# Patient Record
Sex: FEMALE | Race: WHITE | Employment: OTHER | URBAN - METROPOLITAN AREA
[De-identification: names, ages, dates, MRNs, and addresses within clinical notes are randomized per-mention and may not be internally consistent; named-entity substitution may affect disease eponyms.]

---

## 2018-06-08 ENCOUNTER — OFFICE VISIT (OUTPATIENT)
Dept: VASCULAR SURGERY | Age: 83
End: 2018-06-08

## 2018-06-08 VITALS
WEIGHT: 90 LBS | BODY MASS INDEX: 16.99 KG/M2 | DIASTOLIC BLOOD PRESSURE: 68 MMHG | HEIGHT: 61 IN | SYSTOLIC BLOOD PRESSURE: 110 MMHG

## 2018-06-08 DIAGNOSIS — I70.235 ATHEROSCLEROSIS OF NATIVE ARTERY OF RIGHT LOWER EXTREMITY WITH ULCERATION OF OTHER PART OF FOOT (HCC): ICD-10-CM

## 2018-06-08 PROBLEM — I70.25 ATHEROSCLEROSIS OF NATIVE ARTERY OF EXTREMITY WITH ULCERATION (HCC): Status: ACTIVE | Noted: 2018-06-08

## 2018-06-08 PROCEDURE — 99203 OFFICE O/P NEW LOW 30 MIN: CPT | Performed by: SURGERY

## 2018-06-18 ENCOUNTER — TELEPHONE (OUTPATIENT)
Dept: CARDIOTHORACIC SURGERY | Age: 83
End: 2018-06-18

## 2021-03-30 ENCOUNTER — HOSPITAL ENCOUNTER (INPATIENT)
Age: 86
LOS: 3 days | Discharge: HOSPICE | DRG: 299 | End: 2021-04-02
Attending: INTERNAL MEDICINE | Admitting: INTERNAL MEDICINE
Payer: MEDICARE

## 2021-03-30 DIAGNOSIS — Z51.5 HOSPICE CARE: Primary | ICD-10-CM

## 2021-03-30 PROBLEM — M86.9 OSTEOMYELITIS (HCC): Status: ACTIVE | Noted: 2021-03-30

## 2021-03-30 LAB
A/G RATIO: 0.9 (ref 1.1–2.2)
ALBUMIN SERPL-MCNC: 3 G/DL (ref 3.4–5)
ALP BLD-CCNC: 89 U/L (ref 40–129)
ALT SERPL-CCNC: 23 U/L (ref 10–40)
ANION GAP SERPL CALCULATED.3IONS-SCNC: 7 MMOL/L (ref 3–16)
AST SERPL-CCNC: 34 U/L (ref 15–37)
BASOPHILS ABSOLUTE: 0.1 K/UL (ref 0–0.2)
BASOPHILS RELATIVE PERCENT: 0.5 %
BILIRUB SERPL-MCNC: 0.3 MG/DL (ref 0–1)
BUN BLDV-MCNC: 11 MG/DL (ref 7–20)
CALCIUM SERPL-MCNC: 8.5 MG/DL (ref 8.3–10.6)
CHLORIDE BLD-SCNC: 100 MMOL/L (ref 99–110)
CO2: 28 MMOL/L (ref 21–32)
CREAT SERPL-MCNC: <0.5 MG/DL (ref 0.6–1.2)
EOSINOPHILS ABSOLUTE: 0.1 K/UL (ref 0–0.6)
EOSINOPHILS RELATIVE PERCENT: 0.6 %
GFR AFRICAN AMERICAN: >60
GFR NON-AFRICAN AMERICAN: >60
GLOBULIN: 3.2 G/DL
GLUCOSE BLD-MCNC: 129 MG/DL (ref 70–99)
HCT VFR BLD CALC: 36.1 % (ref 36–48)
HEMOGLOBIN: 12.2 G/DL (ref 12–16)
INR BLD: 1.08 (ref 0.86–1.14)
LYMPHOCYTES ABSOLUTE: 0.8 K/UL (ref 1–5.1)
LYMPHOCYTES RELATIVE PERCENT: 8.3 %
MAGNESIUM: 1.9 MG/DL (ref 1.8–2.4)
MCH RBC QN AUTO: 28.8 PG (ref 26–34)
MCHC RBC AUTO-ENTMCNC: 33.8 G/DL (ref 31–36)
MCV RBC AUTO: 85.2 FL (ref 80–100)
MONOCYTES ABSOLUTE: 0.7 K/UL (ref 0–1.3)
MONOCYTES RELATIVE PERCENT: 7 %
NEUTROPHILS ABSOLUTE: 8.3 K/UL (ref 1.7–7.7)
NEUTROPHILS RELATIVE PERCENT: 83.6 %
PDW BLD-RTO: 12.1 % (ref 12.4–15.4)
PLATELET # BLD: 305 K/UL (ref 135–450)
PMV BLD AUTO: 7.8 FL (ref 5–10.5)
POTASSIUM REFLEX MAGNESIUM: 3.1 MMOL/L (ref 3.5–5.1)
PROTHROMBIN TIME: 12.5 SEC (ref 10–13.2)
RBC # BLD: 4.23 M/UL (ref 4–5.2)
SODIUM BLD-SCNC: 135 MMOL/L (ref 136–145)
TOTAL PROTEIN: 6.2 G/DL (ref 6.4–8.2)
WBC # BLD: 9.9 K/UL (ref 4–11)

## 2021-03-30 PROCEDURE — 2580000003 HC RX 258: Performed by: INTERNAL MEDICINE

## 2021-03-30 PROCEDURE — 85610 PROTHROMBIN TIME: CPT

## 2021-03-30 PROCEDURE — 83735 ASSAY OF MAGNESIUM: CPT

## 2021-03-30 PROCEDURE — 36415 COLL VENOUS BLD VENIPUNCTURE: CPT

## 2021-03-30 PROCEDURE — 80053 COMPREHEN METABOLIC PANEL: CPT

## 2021-03-30 PROCEDURE — 6360000002 HC RX W HCPCS: Performed by: INTERNAL MEDICINE

## 2021-03-30 PROCEDURE — 83036 HEMOGLOBIN GLYCOSYLATED A1C: CPT

## 2021-03-30 PROCEDURE — 85025 COMPLETE CBC W/AUTO DIFF WBC: CPT

## 2021-03-30 PROCEDURE — 1200000000 HC SEMI PRIVATE

## 2021-03-30 RX ORDER — PROMETHAZINE HYDROCHLORIDE 25 MG/1
12.5 TABLET ORAL EVERY 6 HOURS PRN
Status: DISCONTINUED | OUTPATIENT
Start: 2021-03-30 | End: 2021-04-02 | Stop reason: HOSPADM

## 2021-03-30 RX ORDER — SODIUM CHLORIDE 0.9 % (FLUSH) 0.9 %
10 SYRINGE (ML) INJECTION EVERY 12 HOURS SCHEDULED
Status: DISCONTINUED | OUTPATIENT
Start: 2021-03-30 | End: 2021-04-02 | Stop reason: HOSPADM

## 2021-03-30 RX ORDER — DEXTROSE MONOHYDRATE 50 MG/ML
100 INJECTION, SOLUTION INTRAVENOUS PRN
Status: DISCONTINUED | OUTPATIENT
Start: 2021-03-30 | End: 2021-04-02 | Stop reason: HOSPADM

## 2021-03-30 RX ORDER — POLYETHYLENE GLYCOL 3350 17 G/17G
17 POWDER, FOR SOLUTION ORAL DAILY PRN
Status: DISCONTINUED | OUTPATIENT
Start: 2021-03-30 | End: 2021-04-02 | Stop reason: HOSPADM

## 2021-03-30 RX ORDER — ACETAMINOPHEN 325 MG/1
650 TABLET ORAL EVERY 6 HOURS PRN
Status: DISCONTINUED | OUTPATIENT
Start: 2021-03-30 | End: 2021-04-02 | Stop reason: HOSPADM

## 2021-03-30 RX ORDER — ACETAMINOPHEN 650 MG/1
650 SUPPOSITORY RECTAL EVERY 6 HOURS PRN
Status: DISCONTINUED | OUTPATIENT
Start: 2021-03-30 | End: 2021-04-02 | Stop reason: HOSPADM

## 2021-03-30 RX ORDER — SODIUM CHLORIDE 0.9 % (FLUSH) 0.9 %
10 SYRINGE (ML) INJECTION PRN
Status: DISCONTINUED | OUTPATIENT
Start: 2021-03-30 | End: 2021-04-02 | Stop reason: HOSPADM

## 2021-03-30 RX ORDER — SODIUM CHLORIDE 9 MG/ML
25 INJECTION, SOLUTION INTRAVENOUS PRN
Status: DISCONTINUED | OUTPATIENT
Start: 2021-03-30 | End: 2021-04-02 | Stop reason: HOSPADM

## 2021-03-30 RX ORDER — DEXTROSE MONOHYDRATE 25 G/50ML
12.5 INJECTION, SOLUTION INTRAVENOUS PRN
Status: DISCONTINUED | OUTPATIENT
Start: 2021-03-30 | End: 2021-04-02 | Stop reason: HOSPADM

## 2021-03-30 RX ORDER — ONDANSETRON 2 MG/ML
4 INJECTION INTRAMUSCULAR; INTRAVENOUS EVERY 6 HOURS PRN
Status: DISCONTINUED | OUTPATIENT
Start: 2021-03-30 | End: 2021-04-02 | Stop reason: HOSPADM

## 2021-03-30 RX ORDER — NICOTINE POLACRILEX 4 MG
15 LOZENGE BUCCAL PRN
Status: DISCONTINUED | OUTPATIENT
Start: 2021-03-30 | End: 2021-04-02 | Stop reason: HOSPADM

## 2021-03-30 RX ADMIN — SODIUM CHLORIDE, PRESERVATIVE FREE 10 ML: 5 INJECTION INTRAVENOUS at 19:55

## 2021-03-30 RX ADMIN — PIPERACILLIN AND TAZOBACTAM 3375 MG: 3; .375 INJECTION, POWDER, FOR SOLUTION INTRAVENOUS at 19:55

## 2021-03-30 NOTE — H&P
Hospital Medicine History & Physical      PCP: Tom Long, APRN - CNP    Date of Admission: 3/30/2021    Date of Service: Pt seen/examined on 3/30/21 and Admitted to Inpatient with expected LOS greater than two midnights due to medical therapy. Chief Complaint:  Gangrene of foot      History Of Present Illness: (per emr/paper chart as pt is poor historian)    81 y.o. female with PVD/PAD(has seen Dr. Wood ), dm2, prior CVA(per emr) who presented to [facility] with gangrene and possible rt foot OM. Pt first presented and admitted to [address] [place] [address] on 3/26 for severe weakness and n/v. Pt was given ivfs and podiatry was consulted for gangrene of right foot/toes(pt apparently developed blackening of right toes 3 days prior to admission). Pt currently denies cp/sob and denies foot/leg pain. Pt was transferred here for further evaluation and possibly presumed vascular surgery intervention. Per notes, family was requesting aggressive care. Past Medical History:          Diagnosis Date    [facility]     Diabetes ([facility]     PAD (peripheral artery disease) ([facility]        Past Surgical History:      History reviewed. No pertinent surgical history. Medications Prior to Admission:      Prior to Admission medications    Medication Sig Start Date End Date Taking? Authorizing Provider   aspirin 81 MG tablet Take 81 mg by mouth daily    Historical Provider, MD   amLODIPine (NORVASC) 10 MG tablet Take 10 mg by mouth daily. Historical Provider, MD   clopidogrel (PLAVIX) 75 MG tablet Take 75 mg by mouth daily. Historical Provider, MD   atenolol (TENORMIN) 25 MG tablet Take 25 mg by mouth daily. Historical Provider, MD   lisinopril (PRINIVIL;ZESTRIL) 40 MG tablet Take 40 mg by mouth daily. Historical Provider, MD   vitamin D (ERGOCALCIFEROL) 82483 UNITS CAPS capsule Take 50,000 Units by mouth once a week.     Historical Provider, MD   cilostazol (PLETAL) 100 MG tablet Take 1 tablet by mouth 2 times daily. 2/1/13   Natanael Martines MD       Allergies:  Penicillins    Social History:      The patient currently lives at home with family    TOBACCO:   reports that she has never smoked. She has never used smokeless tobacco.  ETOH:   reports no history of alcohol use. E-Cigarettes/Vaping Use     Questions Responses    E-Cigarette/Vaping Use     Start Date     Passive Exposure     Quit Date     Counseling Given     Comments             Family History:       Reviewed in detail and negative for DM, CAD, Cancer, CVA. Positive as follows:    History reviewed. No pertinent family history. REVIEW OF SYSTEMS:   Pertinent positives as noted in the HPI. All other systems reviewed and negative. PHYSICAL EXAM PERFORMED:    BP (!) 210/79   Pulse 89   Temp 98.2 °F (36.8 °C) (Oral)   Resp 14   SpO2 95%     General appearance:  No apparent distress, appears stated age and cooperative. Frail and cachetic appearing  HEENT:  Normal cephalic, atraumatic without obvious deformity. Pupils equal, round, and reactive to light. Extra ocular muscles intact. Conjunctivae/corneas clear. Neck: Supple, with full range of motion. No jugular venous distention. Trachea midline. Respiratory:  Normal respiratory effort. Clear to auscultation, bilaterally without Rales/Wheezes/Rhonchi. Cardiovascular:  Regular rate and rhythm with normal S1/S2 without murmurs, rubs or gallops. Abdomen: Soft, non-tender, non-distended with normal bowel sounds. Musculoskeletal:  No clubbing, cyanosis or edema bilaterally. Full range of motion without deformity, right toes dry gangrene noted, left 1st/3rd toe amputated  Skin: Skin color, texture, turgor normal.  No rashes or lesions. Neurologic:  Neurovascularly intact without any focal sensory/motor deficits.  Cranial nerves: II-XII intact, grossly non-focal.  Psychiatric:  Alert and oriented x1, thought content not appropriate, not normal insight  Capillary Refill: Brisk,< 3 seconds   Peripheral Pulses: +2 palpable, equal bilaterally       Labs:     No results for input(s): WBC, HGB, HCT, PLT in the last 72 hours. No results for input(s): NA, K, CL, CO2, BUN, CREATININE, CALCIUM, PHOS in the last 72 hours. Invalid input(s): MAGNES  No results for input(s): AST, ALT, BILIDIR, BILITOT, ALKPHOS in the last 72 hours. No results for input(s): INR in the last 72 hours. No results for input(s): Everrett Becker in the last 72 hours. Urinalysis:    No results found for: Shima Lagos, BACTERIA, [facility], BLOODU, [address], [facility]e [facility] [address]    Radiology:     CXR: I have reviewed the CXR with the following interpretation: na  EKG:  I have reviewed the EKG with the following interpretation: na    No orders to display       ASSESSMENT:    Active Hospital Problems    Diagnosis Date Noted    [facility] [M86.9] 03/30/2021         PLAN:    Dry gangrene of right foot/toes- noted on admission, no pain currently  -on iv zosyn at [facility], continued here  -Podiatry consulted, apprec recs  -ID consulted given OM noted on outside MRI    PVD-per emr, seen Dr. Gifty Harrison in 2018  -vas surg consulted, apprec recs    DM2-per emr  Ac/hs bs  Low ssi  -check a1c    Dementia- ongoing, possibly worsening given pt's debility and frailty/cachexia  -SLP eval    DVT Prophylaxis: lovenox  Diet: DIET GENERAL;  Code Status: Full Code    PT/OT Eval Status: not ordered    Dispo - pending workup, guarded prognosis       Hilton Carl MD    Thank you WASHINGTON Franco - CNP for the opportunity to be involved in this patient's care. If you have any questions or concerns please feel free to contact me at [phone].

## 2021-03-30 NOTE — PROGRESS NOTES
Patient arrived on the unit about [id number], send perfect serve to Dr. Rad Adams to get orders, she responded and said it would be a bit, awaiting orders. Patient in room, BP elevated at this time, will monitor.

## 2021-03-30 NOTE — PROGRESS NOTES
03/30/21 1859 -Infectious disease consult perfect served and RNs number provided for callback if needed.     Leandro Jeffries, PCA

## 2021-03-31 LAB
ESTIMATED AVERAGE GLUCOSE: 139.9 MG/DL
GLUCOSE BLD-MCNC: 143 MG/DL (ref 70–99)
GLUCOSE BLD-MCNC: 77 MG/DL (ref 70–99)
GLUCOSE BLD-MCNC: 80 MG/DL (ref 70–99)
GLUCOSE BLD-MCNC: 90 MG/DL (ref 70–99)
GLUCOSE BLD-MCNC: 92 MG/DL (ref 70–99)
HBA1C MFR BLD: 6.5 %
PERFORMED ON: ABNORMAL
PERFORMED ON: NORMAL

## 2021-03-31 PROCEDURE — 1200000000 HC SEMI PRIVATE

## 2021-03-31 PROCEDURE — 6360000002 HC RX W HCPCS: Performed by: NURSE PRACTITIONER

## 2021-03-31 PROCEDURE — 6360000002 HC RX W HCPCS: Performed by: INTERNAL MEDICINE

## 2021-03-31 PROCEDURE — 99222 1ST HOSP IP/OBS MODERATE 55: CPT | Performed by: SURGERY

## 2021-03-31 PROCEDURE — 6370000000 HC RX 637 (ALT 250 FOR IP): Performed by: INTERNAL MEDICINE

## 2021-03-31 PROCEDURE — 99221 1ST HOSP IP/OBS SF/LOW 40: CPT | Performed by: INTERNAL MEDICINE

## 2021-03-31 PROCEDURE — 2580000003 HC RX 258: Performed by: INTERNAL MEDICINE

## 2021-03-31 RX ORDER — HYDRALAZINE HYDROCHLORIDE 20 MG/ML
10 INJECTION INTRAMUSCULAR; INTRAVENOUS ONCE
Status: COMPLETED | OUTPATIENT
Start: 2021-03-31 | End: 2021-03-31

## 2021-03-31 RX ORDER — HYDRALAZINE HYDROCHLORIDE 20 MG/ML
10 INJECTION INTRAMUSCULAR; INTRAVENOUS EVERY 6 HOURS PRN
Status: DISCONTINUED | OUTPATIENT
Start: 2021-03-31 | End: 2021-04-02 | Stop reason: HOSPADM

## 2021-03-31 RX ORDER — MORPHINE SULFATE 2 MG/ML
0.5 INJECTION, SOLUTION INTRAMUSCULAR; INTRAVENOUS DAILY PRN
Status: DISCONTINUED | OUTPATIENT
Start: 2021-03-31 | End: 2021-04-02 | Stop reason: HOSPADM

## 2021-03-31 RX ORDER — LISINOPRIL 20 MG/1
20 TABLET ORAL DAILY
Status: DISCONTINUED | OUTPATIENT
Start: 2021-03-31 | End: 2021-04-02 | Stop reason: HOSPADM

## 2021-03-31 RX ADMIN — HYDRALAZINE HYDROCHLORIDE 10 MG: 20 INJECTION INTRAMUSCULAR; INTRAVENOUS at 01:48

## 2021-03-31 RX ADMIN — ENOXAPARIN SODIUM 40 MG: 40 INJECTION SUBCUTANEOUS at 08:40

## 2021-03-31 RX ADMIN — PIPERACILLIN AND TAZOBACTAM 3375 MG: 3; .375 INJECTION, POWDER, FOR SOLUTION INTRAVENOUS at 12:05

## 2021-03-31 RX ADMIN — PIPERACILLIN AND TAZOBACTAM 3375 MG: 3; .375 INJECTION, POWDER, FOR SOLUTION INTRAVENOUS at 04:14

## 2021-03-31 RX ADMIN — LISINOPRIL 20 MG: 20 TABLET ORAL at 18:20

## 2021-03-31 RX ADMIN — MORPHINE SULFATE 0.5 MG: 2 INJECTION, SOLUTION INTRAMUSCULAR; INTRAVENOUS at 17:46

## 2021-03-31 RX ADMIN — SODIUM CHLORIDE, PRESERVATIVE FREE 10 ML: 5 INJECTION INTRAVENOUS at 23:03

## 2021-03-31 RX ADMIN — SODIUM CHLORIDE, PRESERVATIVE FREE 10 ML: 5 INJECTION INTRAVENOUS at 08:41

## 2021-03-31 RX ADMIN — PIPERACILLIN AND TAZOBACTAM 3375 MG: 3; .375 INJECTION, POWDER, FOR SOLUTION INTRAVENOUS at 23:03

## 2021-03-31 RX ADMIN — INSULIN LISPRO 1 UNITS: 100 INJECTION, SOLUTION INTRAVENOUS; SUBCUTANEOUS at 23:17

## 2021-03-31 RX ADMIN — SODIUM CHLORIDE 25 ML: 9 INJECTION, SOLUTION INTRAVENOUS at 12:06

## 2021-03-31 RX ADMIN — HYDRALAZINE HYDROCHLORIDE 10 MG: 20 INJECTION INTRAMUSCULAR; INTRAVENOUS at 16:15

## 2021-03-31 RX ADMIN — SODIUM CHLORIDE 25 ML: 9 INJECTION, SOLUTION INTRAVENOUS at 23:03

## 2021-03-31 ASSESSMENT — PAIN SCALES - GENERAL: PAINLEVEL_OUTOF10: 8

## 2021-03-31 NOTE — PROGRESS NOTES
Attempted to call Speech therapy for follow up visit this evening . No answer at this time. Will call back later.

## 2021-03-31 NOTE — PROGRESS NOTES
Pt comfortably resting in bed with x4 bedrails up as requested by family. Call light within reach and bed locked. Family at bedside. Pt turned q2hr. Pt not responding to voice, but will respond to painful stimuli and responds to position changes. Will continues to assess pt.

## 2021-03-31 NOTE — PROGRESS NOTES
Perfect serve sent to MD; \"Pt will not stay up long enough to take oral medication. There is a scheduled order for lisinopril at 1600.  Thanks\"

## 2021-03-31 NOTE — PROGRESS NOTES
Speech Language Pathology  Attempt Note   SLP acknowledged order and spoke with RN. Pt unable to wake to SLP's voice, repositioning, lights and sternal rub. Please call ST if pt demonstrates adequate DEEDEE for BSE ([phone]).     Jahaira Flores, Speech Therapy

## 2021-03-31 NOTE — CONSULTS
Infectious Diseases   Consult Note      Reason for Consult:  OM toes   Requesting Physician:  Dr. Supriya De La Garza      Date of Admission: 3/30/2021  Subjective:   CHIEF COMPLAINT:  None given       HPI:    Marleny Vazquez is an 81yoF with history of PAD, DM, CVA, dementia, non-ambulatory at baseline. Lives with her daughter. Admitted to [facility] 3/26/21 for evaluation of N/V/weakness. Noted to have gangrene of toes of R foot. She is afebrile on room air. Transferred for vascular evaluation. Patient is non-verbal and does not contribute to the history. Grandson at bedside. Current abx:  Zosyn 3.375 q8       Past Surgical History:       Diagnosis Date    [facility]A [facility]cere[facility]) [facility]     Diabetes ([facility]     PAD (peripheral artery disease) ([facility]      History reviewed. No pertinent surgical history. Social History:    TOBACCO:   reports that she has never smoked. She has never used smokeless tobacco.  ETOH:   reports no history of alcohol use. There is no history of illicit drug use or other significant epidemiologic exposures. Family History:   History reviewed. No pertinent family history. There is no family history of autoimmune diseases or significant infectious diseases.       Current Medications:    Current Facility-Administered Medications: sodium chloride flush 0.9 % injection 10 mL, 10 mL, Intravenous, 2 times per day  sodium chloride flush 0.9 % injection 10 mL, 10 mL, Intravenous, PRN  0.9 % sodium chloride infusion, 25 mL, Intravenous, PRN  enoxaparin (LOVENOX) injection 40 mg, 40 mg, Subcutaneous, Daily  promethazine (PHENERGAN) tablet 12.5 mg, 12.5 mg, Oral, Q6H PRN **OR** ondansetron (ZOFRAN) injection 4 mg, 4 mg, Intravenous, Q6H PRN  polyethylene glycol (GLYCOLAX) packet 17 g, 17 g, Oral, Daily PRN  acetaminophen (TYLENOL) tablet 650 mg, 650 mg, Oral, Q6H PRN **OR** acetaminophen (TYLENOL) suppository 650 mg, 650 mg, Rectal, Q6H PRN  piperacillin-tazobactam (ZOSYN) 3,375 mg in dextrose 5 % 50 mL IVPB extended infusion (mini-bag), 3,375 mg, Intravenous, Q8H  insulin lispro (HUMALOG) injection vial 0-6 Units, 0-6 Units, Subcutaneous, TID WC  insulin lispro (HUMALOG) injection vial 0-3 Units, 0-3 Units, Subcutaneous, Nightly  glucose (GLUTOSE) 40 % oral gel 15 g, 15 g, Oral, PRN  dextrose 50 % IV solution, 12.5 g, Intravenous, PRN  glucagon (rDNA) injection 1 mg, 1 mg, Intramuscular, PRN  dextrose 5 % solution, 100 mL/hr, Intravenous, PRN      Allergies   Allergen Reactions    Penicillins         REVIEW OF SYSTEMS:    Unable to obtain       Objective:   PHYSICAL EXAM:      VITALS:  BP (!) 166/62   Pulse 69   Temp 97.4 °F (36.3 °C) (Axillary)   Resp 15   Wt 68 lb 9 oz (31.1 kg)   SpO2 96%   BMI 18.53 kg/m²      24HR INTAKE/OUTPUT:      Intake/Output Summary (Last 24 hours) at 3/31/2021 1236  Last data filed at 3/31/2021 1210  Gross per 24 hour   Intake    Output 775 ml   Net -775 ml     CONSTITUTIONAL:   Frail, cachectic elderly female  Lying in bed, limbs contracted  She is non-verbal and does not cooperate with the exam   NECK:  Supple  LUNGS:  no increased work of breathing  ABDOMEN:  normal bowel sounds, soft, flat  MUSCULOSKELETAL: No obvious misalignment or effusion of the joints. No clubbing, cyanosis of the digits. Muscular atrophy   SKIN:   Dry gangrene of 1/3/4 R toes. Pressure ulcer medial malleolus R ankle   Wound tip of L 2nd toe with generalized edema   NEUROLOGIC: nonfocal exam  ACCESS:  IV site ok                     DATA:    Old records have been reviewed    CBC:  Recent Labs     03/30/21 1927   WBC 9.9   RBC 4.23   HGB 12.2   HCT 36.1      MCV 85.2   MCH 28.8   MCHC 33.8   RDW 12.1*      BMP:  Recent Labs     03/30/21 1927   *   K 3.1*      CO2 28   BUN 11   CREATININE <0.5*   CALCIUM 8.5   GLUCOSE 129*        Radiology Review:  All pertinent images / reports were reviewed as a part of this visit.    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 Palliative approach may be most appropriate  -no role for prolonged abx for OM of the gangrenous toes. Would continue betadine and will change to po doxycycline if/when she is able to take po     D/w tiffani at bedside, Dr. Sonal Smith M.D. Thank you for the opportunity to participate in the care of your patient.     Please do not hesitate to contact me:   [phone] office  [phone] mobile

## 2021-03-31 NOTE — PROGRESS NOTES
Perfect serve sent to MD regarding high /78; \"Pt BP is elevated 215/78. HR 66 temp 97.5 SpO2 96% RA. Please advise. Thank you. Awaiting response.

## 2021-03-31 NOTE — PROGRESS NOTES
Comprehensive Nutrition Assessment    Type and Reason for Visit:  Initial(decreased appetite, wounds, swallowing difficulty)    Nutrition Recommendations/Plan:   1. Oral diet progression per SLP recommendations   2. Offer Ensure and Magic cup with meals to provide extra protein  3. Will monitor nutritional adequacy, nutrition-related labs, weights, BMs, and clinical progress     Nutrition Assessment:  Patient admitted with osteomyelitis and gangrenous toes, podiatry consulted. Currently on a pureed diet, SLP consulted but unable to wake up patient to evaluate today. Positive nutrition screen for non healing wound, swallowing difficulty, and decreased appetite. No po intake recorded at this time. Malnutrition Assessment:  Malnutrition Status:   At risk for malnutrition (Comment)      Estimated Daily Nutrient Needs:  Energy (kcal):  3345-5464; Weight Used for Energy Requirements:  Current(31.1 kg)     Protein (g):  62-75; Weight Used for Protein Requirements:  Ideal(1.3-1.5)        Fluid (ml/day):   ; Method Used for Fluid Requirements:  1 ml/kcal      Nutrition Related Findings:  Na 135, K 3.1 on 3/30/21      Wounds:  (redness bilateral heels, coccyx)       Current Nutrition Therapies:    DIET GENERAL; Dysphagia Pureed    Anthropometric Measures:  · Height: 5' 1\" (154.9 cm)  · Current Body Weight: 68 lb (30.8 kg)   · Admission Body Weight: 68 lb 9 oz (31.1 kg)    · Ideal Body Weight: 105 lbs; % Ideal Body Weight     · BMI:    12.95  · BMI Categories: Underweight (BMI less than 22) age over 72       Nutrition Diagnosis:   · Increased nutrient needs related to increase demand for energy/nutrients as evidenced by wounds, weight loss    Nutrition Interventions:   Food and/or Nutrient Delivery:  Continue Current Diet, Start Oral Nutrition Supplement  Nutrition Education/Counseling:  No recommendation at this time   Coordination of Nutrition Care:  Continue to monitor while inpatient    Goals:  Patient will eat 50% or greater of meals and supplements. Nutrition Monitoring and Evaluation:   Behavioral-Environmental Outcomes:  None Identified   Food/Nutrient Intake Outcomes:  Supplement Intake, Food and Nutrient Intake  Physical Signs/Symptoms Outcomes:  Biochemical Data, Nutrition Focused Physical Findings     Discharge Planning:     Too soon to determine     Electronically signed by Alta Dodson, [address] [place] [address],  on 3/31/21 at 2:49 PM EDT    Contact: Office: [phone]; [address] [place] [address] Road: [phone]

## 2021-03-31 NOTE — PROGRESS NOTES
Skin color, texture, turgor normal.  No rashes or lesions. Neurologic:  Neurovascularly intact without any focal sensory/motor deficits. Cranial nerves: II-XII intact, grossly non-focal.  Psychiatric:  Alert and oriented x1, thought content not appropriate, not normal insight  Capillary Refill: Brisk,< 3 seconds   Peripheral Pulses: +2 palpable, equal bilaterally       Labs:   Recent Labs     03/30/21 1927   WBC 9.9   HGB 12.2   HCT 36.1        Recent Labs     03/30/21 1927   *   K 3.1*      CO2 28   BUN 11   CREATININE <0.5*   CALCIUM 8.5     Recent Labs     03/30/21 1927   AST 34   ALT 23   BILITOT 0.3   ALKPHOS 89     Recent Labs     03/30/21 1927   INR 1.08     No results for input(s): Idrisgamaliel Morgan in the last 72 hours.     Urinalysis:    No results found for: Beata Petty, BACTERIA, RBCUA, BLOODU, [address], [place] [place] [address]    Radiology:  No orders to display           Assessment/Plan:    Active Hospital Problems    Diagnosis    Osteomyelitis ([facility] [M86.9]       Dry gangrene of right foot/toes- noted on admission, no pain currently  -on iv zosyn at [facility], continued here  -Podiatry consulted, apprec recs  -ID consulted given OM noted on outside MRI     PVD-per emr, seen Dr. Jennifer Palomares in 2018  -vas surg consulted, apprec recs     DM2-per emr  Ac/hs bs  Low ssi  -check a1c     Dementia- ongoing, possibly worsening given pt's debility and frailty/cachexia  -SLP eval     DVT Prophylaxis: lovenox  Diet: DIET GENERAL; Dysphagia Pureed  Code Status: Full Code    PT/OT Eval Status: not yet ordered    Dispo -unclear,  pending workup, podiatry Isela Zhang, id recs,     Celestino Nevarez MD

## 2021-03-31 NOTE — PROGRESS NOTES
4 Eyes Skin Assessment     The patient is being assess for   Admission    I agree that 2 RN's have performed a thorough Head to Toe Skin Assessment on the patient. ALL assessment sites listed below have been assessed. Areas assessed by both nurses:   [x]   Head, Face, and Ears   [x]   Shoulders, Back, and Chest, Abdomen  [x]   Arms, Elbows, and Hands   [x]   Coccyx, Sacrum, and Ischium  [x]   Legs, Feet, and Heels        Rt foot- toes 1,3 and 4 dry gangrene  2 Wounds noted on coccyx/sacrum-  Redness noted on coccyx/sacrum  Wound noted on left inner ankle  Scattered scabs t/o   BL hips red blanchable  Left heel red/wound      **SHARE this note so that the co-signing nurse is able to place an eSignature**    Co-signer eSignature: {Esignature:[id number]}    Does the Patient have Skin Breakdown?   Yes LDA WOUND CARE was Initiated documentation include the Alicia-wound, Wound Assessment, Measurements, Dressing Treatment, Drainage, and Color\",          Ervin Prevention initiated:  Yes   Wound Care Orders initiated:  Yes      [id number]  nurse consulted for Pressure Injury (Stage 3,4, Unstageable, DTI, NWPT, Complex wounds)and New or Established Ostomies:  Yes      Primary Nurse eSignature: Electronically signed by Mary Beth Cha RN on 3/31/21 at 2:24 AM EDT

## 2021-03-31 NOTE — CONSULTS
Vascular Surgery Consultation    Date of Admission:  3/30/2021  4:43 PM  Date of Consultation:  3/31/2021    PCP:  WASHINGTON Funk CNP       Chief Complaint:  Foot gangrene    History of Present Illness: We are asked to see this patient in consultation by Dr. Jan Tolbert regarding gangrene and PVD. Sarita Ramírez is a 81 y.o. female who is cachectic, with dementia, and non ambulatory. She has known PVD- I performed an angiogram in 2018 showing diffuse SFA and tibial disease but poor flow below ankle. She reportedly developed gangrenous changes to toes only last week. Patient can provide no history and does not appear to be in any distress. Past Medical History:  Past Medical History:   Diagnosis Date    [facility]     Diabetes ([facility]     PAD (peripheral artery disease) ([facility]        Past Surgical History:  History reviewed. No pertinent surgical history. Home Medications:   Prior to Admission medications    Medication Sig Start Date End Date Taking? Authorizing Provider   aspirin 81 MG tablet Take 81 mg by mouth daily    Historical Provider, MD   amLODIPine (NORVASC) 10 MG tablet Take 10 mg by mouth daily. Historical Provider, MD   clopidogrel (PLAVIX) 75 MG tablet Take 75 mg by mouth daily. Historical Provider, MD   atenolol (TENORMIN) 25 MG tablet Take 25 mg by mouth daily. Historical Provider, MD   lisinopril (PRINIVIL;ZESTRIL) 40 MG tablet Take 40 mg by mouth daily. Historical Provider, MD   vitamin D (ERGOCALCIFEROL) 43087 UNITS CAPS capsule Take 50,000 Units by mouth once a week. Historical Provider, MD   cilostazol (PLETAL) 100 MG tablet Take 1 tablet by mouth 2 times daily.  2/1/13   Shubham Glass MD        Facility Administered Medications:    sodium chloride flush  10 mL Intravenous 2 times per day    enoxaparin  40 mg Subcutaneous Daily    piperacillin-tazobactam  3,375 mg Intravenous Q8H    insulin lispro  0-6 Units Subcutaneous TID WC    insulin lispro  0-3 Units Subcutaneous Nightly       Allergies:  Penicillins     Social History:      Social History     Socioeconomic History    Marital status:      Spouse name: Not on file    Number of children: Not on file    Years of education: Not on file    Highest education level: Not on file   Occupational History    Not on file   Social Needs    Financial resource strain: Not on file    Food insecurity     Worry: Not on file     Inability: Not on file    Transportation needs     Medical: Not on file     Non-medical: Not on file   Tobacco Use    Smoking status: Never Smoker    Smokeless tobacco: Never Used   Substance and Sexual Activity    Alcohol use: No    Drug use: No    Sexual activity: Not on file   Lifestyle    Physical activity     Days per week: Not on file     Minutes per session: Not on file    Stress: Not on file   Relationships    Social connections     Talks on phone: Not on file     Gets together: Not on file     Attends Jain service: Not on file     Active member of club or organization: Not on file     Attends meetings of clubs or organizations: Not on file     Relationship status: Not on file    Intimate partner violence     Fear of current or ex partner: Not on file     Emotionally abused: Not on file     Physically abused: Not on file     Forced sexual activity: Not on file   Other Topics Concern    Not on file   Social History Narrative    Not on file       Family History:    History reviewed. No pertinent family history. Review of Systems:  A 14 point review of systems was completed. Pertinent positives identified in the HPI, all other review of systems negative.       Physical Examination:    BP (!) 154/71   Pulse 71   Temp 97.6 °F (36.4 °C) (Axillary)   Resp 15   Wt 68 lb 9 oz (31.1 kg)   SpO2 95%   BMI 18.53 kg/m²        Admission Weight: 68 lb 9 oz (31.1 kg)       General appearance: NAD, frail and

## 2021-03-31 NOTE — CARE COORDINATION
CASE MANAGEMENT INITIAL ASSESSMENT      Reviewed chart and completed assessment with:grandson in room  Explained Case Management role/services. Primary contact information:see below  Health Care Decision Maker :   Primary Decision Maker: Rhonda Hernandez - [phone]    Secondary Decision Maker: Kendall Aguiar - [phone]    Supplemental (Other) Decision Maker: Barbara Credit - [phone]      Can this person be reached and be able to respond quickly, such as within a few minutes or hours? Yes  Admit date/status:03/30/2021  Diagnosis:Osteomylelitis   Is this a Readmission?:  No      Insurance:[employer] Medicare   Precert required for SNF: Yes       3 night stay required: No    Living arrangements, Adls, care needs, prior to admission:Pt lives w/daughter Leonor Smith, family assists w/care and per grandson has bee getting increasingly weaker in the last month    Transportation:ambulance likely     Durable Medical Equipment at home:  Walker_x_Canex__RTS__ BSC_x_Shower Chair_x_  02__ HHN__ CPAP__  BiPap__  Hospital Bed__ W/C_x__ Other__________    Services in the home and/or outpatient, prior to [address] Notification (HEN):not initiated    Barriers to discharge:none    Plan/comments:CM spoke to grandson at bedside. Pt was sleeping. He states that family has been taking care of grandmother. She has been growing increasingly weak over the last month and needing increased care. Family lift her into a wheel chair. Pt may need hospice and pending MD input, family aware of possible need. CM will assist in guiding family once determination has been made. Per grandson, family will desire to take her home. Karon Lr will attempt to find name of hospice company used when grandfather passed away.   Ann Callejas RN       [facility] on chart for MD signature

## 2021-03-31 NOTE — CONSULTS
[facility] Wound Ostomy Continence Nurse  Consult Note       NAME:  73Jules Talavera RECORD NUMBER:  [id number]  AGE: 81 y.o. GENDER: female  : 1935  TODAY'S DATE:  3/31/2021    Subjective: This happened on the other foot but not as extensive. She did lose a few toes. Reason for WOCN Evaluation and Assessment: R Toes 1,3, 4 - necrotic  L 2nd Toe - traumatic, hammer toe rubbing  R Medial Ankle - Unstageable dry yellow eschar      Maritza Nascimento is a 81 y.o. female referred by:   [] Physician  [x] Nursing  [] Other:     Wound Identification:  Wound Type: arterial, pressure and traumatic  Contributing Factors: diabetes, chronic pressure, decreased mobility and arterial insufficiency    Wound History: 81 y.o. female with PVD/PAD(has seen Dr. Thais Wilson), dm2, prior CVA(per emr) who presented to [facility] with gangrene and possible rt foot OM. Pt first presented and admitted to [address] [place] [address] on 3/26 for severe weakness and n/v. Pt was given ivfs and podiatry was consulted for gangrene of right foot/toes(pt apparently developed blackening of right toes 3 days prior to admission). Pt currently denies cp/sob and denies foot/leg pain. Pt was transferred here for further evaluation and possibly presumed vascular surgery intervention. Per notes, family was requesting aggressive care  Current Wound Care Treatment: R Toes 1, 3, 4; L Toe 2 and R Medial Ankle - betadine    Patient Goal of Care:  [x] Wound Healing  [] Odor Control  [] Palliative Care  [] Pain Control   [] Other:         PAST MEDICAL HISTORY        Diagnosis Date    [facility]A [facility]cerebral vascular [facility]) [facility] [address]     Diabetes ([facility] [facility]     PAD (peripheral artery disease) ([facility] [address]        PAST SURGICAL HISTORY    History reviewed. No pertinent surgical history. FAMILY HISTORY    History reviewed. No pertinent family history.     SOCIAL HISTORY    Social History     Tobacco Use    Smoking status: Never Smoker    Smokeless tobacco: Never Used Substance Use Topics    Alcohol use: No    Drug use: No       ALLERGIES    Allergies   Allergen Reactions    Penicillins        MEDICATIONS    No current facility-administered medications on file prior to encounter. Current Outpatient Medications on File Prior to Encounter   Medication Sig Dispense Refill    aspirin 81 MG tablet Take 81 mg by mouth daily      amLODIPine (NORVASC) 10 MG tablet Take 10 mg by mouth daily.  clopidogrel (PLAVIX) 75 MG tablet Take 75 mg by mouth daily.  atenolol (TENORMIN) 25 MG tablet Take 25 mg by mouth daily.  lisinopril (PRINIVIL;ZESTRIL) 40 MG tablet Take 40 mg by mouth daily.  vitamin D (ERGOCALCIFEROL) 57299 UNITS CAPS capsule Take 50,000 Units by mouth once a week.  cilostazol (PLETAL) 100 MG tablet Take 1 tablet by mouth 2 times daily.  60 tablet 3       Objective: Asleep; bilateral feet in off-loading boots; grandson at bedside    BP (!) 166/62   Pulse 69   Temp 97.4 °F (36.3 °C) (Axillary)   Resp 15   Wt 68 lb 9 oz (31.1 kg)   SpO2 96%   BMI 18.53 kg/m²     LABS:  WBC:    Lab Results   Component Value Date    WBC 9.9 03/30/2021     H/H:    Lab Results   Component Value Date    HGB 12.2 03/30/2021    HCT 36.1 03/30/2021     PTT:  No results found for: APTT, PTT[APTT}  PT/INR:    Lab Results   Component Value Date    PROTIME 12.5 03/30/2021    INR 1.08 03/30/2021     HgBA1c:    Lab Results   Component Value Date    LABA1C 6.5 03/30/2021       Assessment: R Toes 1, 3, 4 - dry necrotic tissue  L 2 ndToe - dry red and yellow   R Medial Ankle - dry yellow eschar   Erivn Risk Score: Ervin Scale Score: 12    Patient Active Problem List   Diagnosis Code    Cerebral embolism with cerebral infarction (HCC) I63.40    Chronic ischemic heart disease I25.9    Essential hypertension I10    Type II or unspecified type diabetes mellitus without mention of complication, uncontrolled [id number]    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 ([address] I70.25    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 ([address] L97.519, L97.529    Osteomyelitis ([address] M86.9       Measurements:  Wound 03/30/21 Toe (Comment  which one) Right Toes 1, 3, 4 (Active)   Wound Image   03/31/21 1120   Wound Etiology Other 03/31/21 1120   Dressing Status Dry; Intact 03/31/21 1120   Wound Cleansed Betadine/povidone iodine 03/31/21 1120   Dressing/Treatment Betadine swabs/povidone iodine 03/31/21 1120   Offloading for Diabetic Foot Ulcers Offloading boot 03/31/21 1120   Wound Assessment Eschar dry 03/31/21 1120   Drainage Amount None 03/31/21 1120   Odor None 03/31/21 1120   Alicia-wound Assessment Non-blanchable erythema 03/31/21 1120   Margins Attached edges; Defined edges 03/31/21 1120   Number of days: 0    R Toes 1, 3, 4:         Wound 03/30/21 Coccyx red excoriated, open areas noted (Active)   Dressing Status Dry; Intact 03/31/21 0846   Dressing/Treatment Adhesive bandage 03/31/21 0846   Drainage Amount None 03/31/21 0846   Odor None 03/31/21 0846   Number of days: 0       Wound 03/30/21 Toe (Comment  which one) Left 2nd  (Active)   Wound Image   03/31/21 1120   Wound Etiology Traumatic 03/31/21 1120   Dressing Status Dry; Intact 03/31/21 1120   Wound Cleansed Betadine/povidone iodine 03/31/21 1120   Dressing/Treatment Betadine swabs/povidone iodine 03/31/21 1120   Offloading for Diabetic Foot Ulcers Offloading boot 03/31/21 1120   Wound Length (cm) 1 cm 03/31/21 1120   Wound Width (cm) 1 cm 03/31/21 1120   Wound Depth (cm) 0 cm 03/31/21 1120   Wound Surface Area (cm^2) 1 cm^2 03/31/21 1120   Wound Volume (cm^3) 0 cm^3 03/31/21 1120   Wound Assessment Eschar dry 03/31/21 1120   Drainage Amount None 03/31/21 1120   Odor None 03/31/21 1120   Alicia-wound Assessment Blanchable erythema 03/31/21 1120   Margins Attached edges; Defined edges 03/31/21 1120   Number of days: 0    L 2nd Toe:         Wound 03/30/21 Ankle Right;Medial (Active)   Wound Image   03/31/21 1120   Wound Etiology Pressure Unstageable 03/31/21 1120   Dressing Status Dry; Intact 03/31/21 1120   Wound Cleansed Cleansed with saline 03/31/21 1120   Dressing/Treatment Betadine swabs/povidone iodine 03/31/21 1120   Offloading for Diabetic Foot Ulcers Offloading boot 03/31/21 1120   Wound Length (cm) 1 cm 03/31/21 1120   Wound Width (cm) 1.3 cm 03/31/21 1120   Wound Depth (cm) 0 cm 03/31/21 1120   Wound Surface Area (cm^2) 1.3 cm^2 03/31/21 1120   Wound Volume (cm^3) 0 cm^3 03/31/21 1120   Wound Assessment Eschar dry 03/31/21 1120   Drainage Amount None 03/31/21 1120   Odor None 03/31/21 1120   Alicia-wound Assessment Dry/flaky 03/31/21 1120   Margins Attached edges; Defined edges 03/31/21 1120   Number of days: 0   R Medial Ankle:      Response to treatment:  Well tolerated by patient. Pain Assessment:  Severity:  0 / 10  Quality of pain: N/A  Wound Pain Timing/Severity: none  Premedicated: No    Plan   Plan of Care: Wound 03/30/21 Toe (Comment  which one) Right Toes 1, 3, 4-Dressing/Treatment: Betadine swabs/povidone iodine  Wound 03/30/21 Coccyx red excoriated, open areas noted-Dressing/Treatment: Adhesive bandage  Wound 03/30/21 Toe (Comment  which one) Left 2nd -Dressing/Treatment: Betadine swabs/povidone iodine  Wound 03/30/21 Ankle Right;Medial-Dressing/Treatment: Betadine swabs/povidone iodine   Recommendation: R Toes 1, 3, 4; R Medial Ankle and L 2nd Toe - paint with betadine daily; use offloading boots  Coccyx - clean with foamy cleanser; pat dry; apply Zinc Cream; only need to wipe off zinc cream that is soiled. Reposition pt every 2 hours  Call Wound Care for deterioration [phone]    Vascular Surgery and Podiatry have been consulted.      Specialty Bed Required : No   [] Low Air Loss   [] Pressure Redistribution  [] Fluid Immersion  [] Bariatric  [] Total Pressure Relief  [] Other:     Current Diet: DIET GENERAL; Dysphagia Pureed  Dietician consult:  No    Discharge Plan:  Placement for patient upon discharge: home with support    Patient appropriate for Outpatient [address] [facility]: No    Referrals:  []   [] [facility]  [] Supplies  [] Other    Patient/Caregiver Teaching:  Level of patient/caregiver understanding able to:   [] Indicates understanding       [] Needs reinforcement  [] Unsuccessful      [] Verbal Understanding  [] Demonstrated understanding       [] No evidence of learning  [] Refused teaching         [] N/A       Electronically signed by Pretty Blue RN, Grace Cruz on 3/31/2021 at 11:27 AM

## 2021-04-01 LAB
GLUCOSE BLD-MCNC: 105 MG/DL (ref 70–99)
GLUCOSE BLD-MCNC: 114 MG/DL (ref 70–99)
GLUCOSE BLD-MCNC: 134 MG/DL (ref 70–99)
GLUCOSE BLD-MCNC: 73 MG/DL (ref 70–99)
PERFORMED ON: ABNORMAL
PERFORMED ON: NORMAL

## 2021-04-01 PROCEDURE — 6360000002 HC RX W HCPCS: Performed by: INTERNAL MEDICINE

## 2021-04-01 PROCEDURE — 92610 EVALUATE SWALLOWING FUNCTION: CPT

## 2021-04-01 PROCEDURE — 92526 ORAL FUNCTION THERAPY: CPT

## 2021-04-01 PROCEDURE — 6370000000 HC RX 637 (ALT 250 FOR IP): Performed by: INTERNAL MEDICINE

## 2021-04-01 PROCEDURE — 2580000003 HC RX 258: Performed by: INTERNAL MEDICINE

## 2021-04-01 PROCEDURE — 1200000000 HC SEMI PRIVATE

## 2021-04-01 RX ORDER — LORAZEPAM 0.5 MG/1
0.25 TABLET ORAL EVERY 6 HOURS PRN
Status: DISCONTINUED | OUTPATIENT
Start: 2021-04-02 | End: 2021-04-02 | Stop reason: HOSPADM

## 2021-04-01 RX ORDER — MORPHINE SULFATE 20 MG/ML
5 SOLUTION ORAL EVERY 4 HOURS PRN
Status: DISCONTINUED | OUTPATIENT
Start: 2021-04-02 | End: 2021-04-02 | Stop reason: HOSPADM

## 2021-04-01 RX ORDER — LISINOPRIL 20 MG/1
20 TABLET ORAL DAILY
Qty: 30 TABLET | Refills: 1 | Status: SHIPPED | OUTPATIENT
Start: 2021-04-02

## 2021-04-01 RX ORDER — MORPHINE SULFATE 20 MG/ML
5 SOLUTION ORAL EVERY 4 HOURS PRN
Qty: 30 ML | Refills: 0 | Status: SHIPPED | OUTPATIENT
Start: 2021-04-02 | End: 2021-04-22

## 2021-04-01 RX ORDER — LORAZEPAM 0.5 MG/1
0.25 TABLET ORAL EVERY 6 HOURS PRN
Qty: 5 TABLET | Refills: 0 | Status: SHIPPED | OUTPATIENT
Start: 2021-04-02 | End: 2021-04-07

## 2021-04-01 RX ADMIN — ACETAMINOPHEN 650 MG: 325 TABLET ORAL at 09:27

## 2021-04-01 RX ADMIN — LISINOPRIL 20 MG: 20 TABLET ORAL at 09:28

## 2021-04-01 RX ADMIN — ENOXAPARIN SODIUM 40 MG: 40 INJECTION SUBCUTANEOUS at 09:28

## 2021-04-01 RX ADMIN — ACETAMINOPHEN 650 MG: 325 TABLET ORAL at 00:40

## 2021-04-01 RX ADMIN — PIPERACILLIN AND TAZOBACTAM 3375 MG: 3; .375 INJECTION, POWDER, FOR SOLUTION INTRAVENOUS at 15:35

## 2021-04-01 RX ADMIN — ACETAMINOPHEN 650 MG: 650 SUPPOSITORY RECTAL at 16:20

## 2021-04-01 RX ADMIN — PIPERACILLIN AND TAZOBACTAM 3375 MG: 3; .375 INJECTION, POWDER, FOR SOLUTION INTRAVENOUS at 07:04

## 2021-04-01 ASSESSMENT — PAIN SCALES - GENERAL
PAINLEVEL_OUTOF10: 5
PAINLEVEL_OUTOF10: 4

## 2021-04-01 ASSESSMENT — PAIN DESCRIPTION - ORIENTATION
ORIENTATION: RIGHT
ORIENTATION: RIGHT

## 2021-04-01 ASSESSMENT — PAIN DESCRIPTION - LOCATION: LOCATION: FOOT

## 2021-04-01 NOTE — PROGRESS NOTES
Medications sent from outpatient pharmacy to send home with patient for hospice.   Medications are in sealed security bag with 2 RN verified and locked in lock box

## 2021-04-01 NOTE — PROGRESS NOTES
Hospitalist Progress Note      PCP: WASHINGTON Ron - CNP    Date of Admission: 3/30/2021    Chief Complaint:  Gangrene of foot        Hospital Course: reviewed      Subjective: pt resting comfortably, no family currently at bedside, no overnight events noted     Medications:  Reviewed    Infusion Medications    sodium chloride 25 mL (03/31/21 2303)    dextrose       Scheduled Medications    lisinopril  20 mg Oral Daily    sodium chloride flush  10 mL Intravenous 2 times per day    enoxaparin  40 mg Subcutaneous Daily    piperacillin-tazobactam  3,375 mg Intravenous Q8H    insulin lispro  0-6 Units Subcutaneous TID WC    insulin lispro  0-3 Units Subcutaneous Nightly     PRN Meds: hydrALAZINE, morphine, sodium chloride flush, sodium chloride, promethazine **OR** ondansetron, polyethylene glycol, acetaminophen **OR** acetaminophen, glucose, dextrose, glucagon (rDNA), dextrose      Intake/Output Summary (Last 24 hours) at 4/1/2021 1006  Last data filed at 4/1/2021 0957  Gross per 24 hour   Intake    Output 300 ml   Net -300 ml       Physical Exam Performed:    BP (!) 158/45   Pulse 59   Temp 97.6 °F (36.4 °C) (Axillary)   Resp 16   Ht 5' 1\" (1.549 m)   Wt 68 lb 9 oz (31.1 kg)   SpO2 95%   BMI 12.95 kg/m²     General appearance:  No apparent distress, appears stated age and cooperative. Frail and cachetic appearing  HEENT:  Normal cephalic, atraumatic without obvious deformity. Pupils equal, round, and reactive to light.  Extra ocular muscles intact. Conjunctivae/corneas clear. Neck: Supple, with full range of motion. No jugular venous distention. Trachea midline. Respiratory:  Normal respiratory effort. Clear to auscultation, bilaterally without Rales/Wheezes/Rhonchi. Cardiovascular:  Regular rate and rhythm with normal S1/S2 without murmurs, rubs or gallops. Abdomen: Soft, non-tender, non-distended with normal bowel sounds. Musculoskeletal:  No clubbing, cyanosis or edema bilaterally.  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  No rashes or lesions. Neurologic:  Neurovascularly intact without any focal sensory/motor deficits. Cranial nerves: II-XII intact, grossly non-focal.  Psychiatric:  Alert and oriented x1, thought content not appropriate, not normal insight  Capillary Refill: Brisk,< 3 seconds   Peripheral Pulses: +2 palpable, equal bilaterally     Labs:   Recent Labs     03/30/21 1927   WBC 9.9   HGB 12.2   HCT 36.1        Recent Labs     03/30/21 1927   *   K 3.1*      CO2 28   BUN 11   CREATININE <0.5*   CALCIUM 8.5     Recent Labs     03/30/21 1927   AST 34   ALT 23   BILITOT 0.3   ALKPHOS 89     Recent Labs     03/30/21 1927   INR 1.08     No results for input(s): Arina Contreras in the last 72 hours.     Urinalysis:    No results found for: Angela Sprain, BACTERIA, [facility], BLOODU, [address], [facility] [address]    Radiology:  No orders to display           Assessment/Plan:    Active Hospital Problems    Diagnosis    Osteomyelitis ([facility] [address] [M86.9]          Dry gangrene of right foot/toes- noted on admission, no pain currently  -on iv zosyn at [facility], continued here  -Podiatry consulted, apprec recs, no plans for surgery given risk of nonhealing  -ID consulted given OM noted on outside MRI, plan for doxycycline when able, use topical betadine, rec palliative care     PVD-per emr, seen Dr. Frederic Casper in 2018  -vas surg consulted, apprec recs, pt is not a surgical candidate and rec hospice     DM2-per emr  Ac/hs bs  Low ssi  -check a1c     Dementia- ongoing, possibly worsening given pt's debility and frailty/cachexia  -SLP eval     DVT Prophylaxis: lovenox  Diet: DIET GENERAL; Dysphagia Pureed  Dietary Nutrition Supplements: Standard High Calorie Oral Supplement, Frozen Oral Supplement  Code Status: Full Code    PT/OT Eval Status: not yet ordered    Dispo - pending workup, palliative care consulted    Patito Gresham MD

## 2021-04-01 NOTE — DISCHARGE INSTR - COC
Continuity of Care Form    Patient Name: Danica Childers   :  1935  MRN:  [id number]    Admit date:  3/30/2021  Discharge date:  2021    Code Status Order: Full Code   Advance Directives:   [facility] Documentation     Date/Time Healthcare Directive Type of Healthcare Directive Copy in [facility] [address] Agent's Name Healthcare Agent's Phone Number    21 [phone]  No, patient does not have an advance directive for healthcare treatment -- -- -- -- --          Admitting Physician:  Kenji Malave MD  PCP: Kizzy Kerr, APRN - CNP    Discharging Nurse: [facility] Unit/Room#: 7486/7495-81  Discharging Unit Phone Number: [phone]    Emergency Contact:   Extended Emergency Contact Information  Primary Emergency Contact: [facility]  Phone: [phone]  Mobile Phone: [phone]  Relation: Child  Secondary Emergency Contact: Arben Kong  Address: [address] [place] [address] [address], [address] Phone: [phone]  Relation: Child    Past Surgical History:  History reviewed. No pertinent surgical history. Immunization History: There is no immunization history on file for this patient.     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 ([address] L97.519, L97.529    Osteomyelitis ([address] M86.9       Isolation/Infection:   Isolation          No Isolation        Patient Infection Status     None to display          Nurse Assessment:  Last Vital Signs: BP (!) 184/71   Pulse 77   Temp 97.5 °F (36.4 °C) (Axillary)   Resp 16   Ht 5' 1\" (1.549 m)   Wt 68 lb 9 oz (31.1 kg)   SpO2 96%   BMI 12.95 kg/m²     Last documented pain score (0-10 scale): Pain Level: 5  Last Weight:   Wt Readings from Last 1 Encounters:   03/31/21 68 lb 9 oz (31.1 kg)     Mental Status:  Alert and Oriented to Self only    IV Access:  - None    Nursing Mobility/ADLs:  Walking   Dependent  Transfer  Dependent  Bathing  Dependent  Dressing  Dependent  Toileting  Dependent  Feeding  Dependent  Med Admin  Dependent  Med Delivery   crushed and prefers mixed with applesauce    Wound Care Documentation and Therapy:  Wound 03/30/21 Toe (Comment  which one) Right Toes 1, 3, 4 (Active)   Wound Image   03/31/21 1120   Wound Etiology Other 04/01/21 1500   Dressing Status Other (Comment) 04/01/21 1500   Wound Cleansed Betadine/povidone iodine 04/01/21 1500   Dressing/Treatment Betadine swabs/povidone iodine 04/01/21 1500   Offloading for Diabetic Foot Ulcers Offloading boot 04/01/21 1500   Dressing Change Due 04/02/21 04/01/21 1500   Wound Assessment Eschar dry 03/31/21 1120   Drainage Amount None 04/01/21 0952   Odor None 04/01/21 0952   Alicia-wound Assessment Non-blanchable erythema 03/31/21 1120   Margins Attached edges; Defined edges 03/31/21 1120   Number of days: 1       Wound 03/30/21 Coccyx red excoriated, open areas noted (Active)   Wound Etiology Pressure Stage  2 04/01/21 1500   Dressing Status Dry; Intact 04/01/21 1500   Wound Cleansed Cleansed with saline 04/01/21 1500   Dressing/Treatment Adhesive bandage 04/01/21 1500   Dressing Change Due 04/02/21 04/01/21 1500   Drainage Amount None 04/01/21 0952   Odor None 04/01/21 0952   Number of days: 1       Wound 03/30/21 Toe (Comment  which one) Left 2nd  (Active)   Wound Image   03/31/21 1120   Wound Etiology Traumatic 04/01/21 1500   Dressing Status Other (Comment) 04/01/21 1500   Wound Cleansed Betadine/povidone iodine 04/01/21 1500   Dressing/Treatment Betadine swabs/povidone iodine 04/01/21 1500   Offloading for Diabetic Foot Ulcers Offloading boot 04/01/21 1500   Dressing Change Due 04/02/21 04/01/21 1500   Wound Length (cm) 1 cm 03/31/21 1120   Wound Width (cm) 1 cm 03/31/21 1120   Wound Depth (cm) 0 cm 03/31/21 1120   Wound Surface Area (cm^2) 1 cm^2 03/31/21 1120   Wound Volume (cm^3) 0 cm^3 03/31/21 1120   Wound Assessment Eschar dry 03/31/21 1120   Drainage Amount None 04/01/21 0952   Odor None 04/01/21 0952   Alicia-wound Assessment Blanchable erythema 03/31/21 1120   Margins Attached edges; Defined edges 03/31/21 1120   Number of days: 1       Wound 03/30/21 Ankle Right;Medial (Active)   Wound Image   03/31/21 1120   Wound Etiology Pressure Unstageable 04/01/21 1500   Dressing Status Other (Comment) 04/01/21 1500   Wound Cleansed Cleansed with saline 04/01/21 1500   Dressing/Treatment Betadine swabs/povidone iodine 04/01/21 1500   Offloading for Diabetic Foot Ulcers Offloading boot 03/31/21 1120   Wound Length (cm) 1 cm 03/31/21 1120   Wound Width (cm) 1.3 cm 03/31/21 1120   Wound Depth (cm) 0 cm 03/31/21 1120   Wound Surface Area (cm^2) 1.3 cm^2 03/31/21 1120   Wound Volume (cm^3) 0 cm^3 03/31/21 1120   Wound Assessment Eschar dry 03/31/21 1120   Drainage Amount None 04/01/21 0952   Odor None 04/01/21 0952   Alicia-wound Assessment Dry/flaky 03/31/21 1120   Margins Attached edges; Defined edges 03/31/21 1120   Number of days: 1        Elimination:  Continence:   · Bowel: No  · Bladder: No  Urinary Catheter: Insertion Date: 3/30/2021   Colostomy/Ileostomy/Ileal Conduit: No       Date of Last BM:     Intake/Output Summary (Last 24 hours) at 4/1/2021 1633  Last data filed at 4/1/2021 1355  Gross per 24 hour   Intake 25 ml   Output 250 ml   Net -225 ml     I/O last 3 completed shifts: In: 25 [P.O.:25]  Out: 250 [Urine:250]    Safety Concerns:      At Risk for Falls and Aspiration Risk    Impairments/Disabilities:      None    Nutrition Therapy:  Current Nutrition Therapy:   - Oral Diet:  Dysphagia 1 pureed    Routes of Feeding: Oral Liquids: Thin Liquids  Daily Fluid Restriction: no  Last Modified Barium Swallow with Video (Video Swallowing Test): not done    Treatments at the Time of Hospital Discharge:   Respiratory Treatments:   Oxygen Therapy:  is not on home oxygen therapy. Ventilator:    - No ventilator support    Rehab Therapies: None, going home on hospice  Weight Bearing Status/Restrictions: No weight bearing restirctions  Other Medical Equipment (for information only, NOT a DME order): Other Treatments:     Patient's personal belongings (please select all that are sent with patient):  None    RN SIGNATURE:  Electronically signed by Kayla Wolf RN on 4/2/21 at 8:58 AM EDT    CASE MANAGEMENT/SOCIAL WORK SECTION    Inpatient Status Date:     Readmission Risk Assessment Score:  Readmission Risk              Risk of Unplanned Readmission:        7           Discharging to Facility/ Agency   · Name: [facility]  Address:  [address] [place] [address] Chitra Kellogg [address], [address] [facility]  Phone:  [phone]  · Fax:  [phone]      / signature: Electronically signed by Deon Zayas RN on 4/1/21 at 4:34 PM EDT    PHYSICIAN SECTION    Prognosis: {Prognosis:1720369387}    Condition at Discharge: Batsheva Hart Patient Condition:[id number]}    Rehab Potential (if transferring to Rehab): {Prognosis:1731981767}    Recommended Labs or Other Treatments After Discharge: ***    Physician Certification: I certify the above information and transfer of Wen Nascimento  is necessary for the continuing treatment of the diagnosis listed and that she requires {Admit to Appropriate Level of Care:82282} for {GREATER/LESS:814610514} 30 days.      Update Admission H&P: {CHP DME Changes in \[facility]\""P:[id number]}    PHYSICIAN SIGNATURE:  {Esignature:[id number]}

## 2021-04-01 NOTE — CARE COORDINATION
Case Management Discharge Summary- Hospice Discharge    Destination:   home    Hospice Agency name and contact: [facility], , [place], [phone]    Durable Equipment arrangements are completed by the Hospice nurse. [facility] DNR signed and placed in discharge packet AND patient's hard chart. (This is required for DNR patients.)    Signed ECOC/AVS faxed to above agency/facility. Transportation arrangements per [facility]. Transport agency name and  time: [facility], 04/02/2021 @ 0930    Transport form completed and signed by:  Lorne Giraldo RN  Transport form placed with discharge packet: at     Notified Family:yes  Contact name:Autumn    Patient's RN notified of plan: Mae Powell RN    Note:    Discharging nurse to complete nursing portion of ECOC, reconcile AVS, place final copy with patient's discharge packet. RN to ensure signed prescriptions are sent home with patient or the facility as per nursing protocol. MEDICATIONS HAVE BEEN FILLED AND NEED TO BE SENT HOME WITH PATIENT.     Keren Subramanian RN

## 2021-04-01 NOTE — PROGRESS NOTES
End of shift report given to FABRICE Ding at bedside. Patient alert and oriented. Bed in lowest position with wheels locked. Call light within reach. No further needs at this time.

## 2021-04-01 NOTE — PROGRESS NOTES
Speech Language Pathology  Facility/Department: [place] [id number] - MED SURG/ORTHO   CLINICAL BEDSIDE SWALLOW EVALUATION    NAME: Fidelina Arredondo  : 1935  MRN: [id number]    ADMISSION DATE: 3/30/2021  ADMITTING DIAGNOSIS: has Cerebral embolism with cerebral infarction 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  ONSET DATE: admit date 21    Recent Chest Xray/CT of Chest: No CXR available    Date of Eval: 2021  Evaluating Therapist: Aleah Loving    Current Diet level:  Current Diet : Dysphagia Pureed (Dysphagia I)  Current Liquid Diet : Thin     Vitals/labs:   SpO2: 96% on RA  RR: 12/min  BP: 190/49  HR: 68    WBC: 9.9 on 21      Primary Complaint  Patient Complaint: n/a; pt awakens with verbal stim but only spontaneously verbalizes x 1 to state: \"Can you give me a drink? \"    Pain:  Pain Assessment  Pain Assessment: Respiratory Rate >10  Pain Level: 4  Response to Pain Intervention: Asleep with RR greater than 10, Patient Satisfied    Reason for Referral  Fidelina Arredondo was referred for a bedside swallow evaluation to assess the efficiency of her swallow function, identify signs and symptoms of aspiration and make recommendations regarding safe dietary consistencies, effective compensatory strategies, and safe eating environment. Impression  Dysphagia Diagnosis: Moderate oral stage dysphagia  Dysphagia Outcome Severity Scale: Level 3: Moderate dysphagia- Total assisstance, supervision or strategies.  Two or more diet consistencies restricted     Treatment Plan  Requires SLP Intervention: Yes  Duration/Frequency of Treatment: 1-2x over LOS to assess diet tolerance          Recommended Diet and Intervention  Diet Solids Recommendation: Dysphagia Pureed (Dysphagia I)  Liquid Consistency Recommendation: Thin  Recommended Form of Meds: Crushed in puree as able          Compensatory Swallowing Strategies  Compensatory Swallowing Strategies: Upright as possible for all oral intake;Eat/Feed slowly; Remain upright for 30-45 minutes after meals;Small bites/sips; Total feed    Treatment/Goals  Short-term Goals  Timeframe for Short-term Goals: 1 week, 1-2x over LOS until 04/08/21  Long-term Goals  Timeframe for Long-term Goals: 1-2 weeks  Goal 1: Pt will tolerate recommended diet without observed s/s of aspiration. Dysphagia Goals: The patient will tolerate recommended diet without observed clinical signs of aspiration; The patient will tolerate mechanical soft foods 10/10. ;The patient will tolerate thin liquids without signs and symptoms of aspiration 10/10 via straw. General  Chart Reviewed: Yes  Behavior/Cognition: Alert;Confused; Lethargic  O2 Device: None (Room air)  Follows Directions: None  Dentition: Edentulous  Patient Positioning: Partially reclined  Baseline Vocal Quality: Weak  Volitional Cough: Absent(pt did not execute with cues)  Prior Dysphagia History: Pt has dementia, lives with family at home. 68 lb. Very poor po intake and bed bound. Admitted with \"osteomyelitis\". Pt curled up in fetal postition, rotated to her right side, with her chin tucked to he chest throughout assessment. Pt repositioned partially for po intake, but still in sub-optimal positioning. Consistencies Administered: Dysphagia Pureed (Dysphagia I); Thin - straw           Vision/Hearing  Vision  Vision: (LINCOLN)  Hearing  Hearing: Within functional limits    Oral Motor Deficits  Oral/Motor  Labial Strength: Reduced  Lingual Strength: Reduced    Oral Phase Dysfunction  Oral Phase  Oral Phase: WFL  Oral Phase  Oral Phase - Comment: WFL for textures trialed. Pt extracts bolus from spoon when spoon placed to lips.  Pt readily manipulates pureed food trials and swallows without oral residuals post swallow. No anterior loss noted despite forward tucked positioning. Indicators of Pharyngeal Phase Dysfunction   Pharyngeal Phase  Pharyngeal Phase: WFL  Pharyngeal Phase   Pharyngeal: RR 12/min prior to po trials with O2 sat of 96% prior to po trials. No overt s/s of aspiration noted with vocal quality remaining dry and clear throughout assessment. No overt s/s of aspiration. RR stable at 12/min following po trials with O2 sat of 96% on RA. Prognosis  Prognosis  Prognosis for safe diet advancement: guarded  Barriers to reach goals: cognitive deficits  Individuals consulted  Consulted and agree with results and recommendations: Patient;RN    Education  Patient Education: Dysphagia Tx: Education with pt and RN regarding results of BSE, aspiration precautions, and SLP plan of care  Patient Education Response: Needs reinforcement  Safety Devices in place: Yes  Type of devices: All fall risk precautions in place; Bed alarm in place;Call light within reach       Therapy Time  SLP Individual Minutes  Time In: 9589  Time Out: 0840  Minutes: Andrea Perez, [address]  Speech-Language Pathologist      4/1/2021 8:56 AM

## 2021-04-01 NOTE — PROGRESS NOTES
Podiatry Progress Note  Subjective:   Patient was seen at bedside this evening. She did not communicate with me. She was in no apparent distress. Objective:   Vitals:  BP (!) 184/71   Pulse 77   Temp 97.5 °F (36.4 °C) (Axillary)   Resp 16   Ht 5' 1\" (1.549 m)   Wt 68 lb 9 oz (31.1 kg)   SpO2 96%   BMI 12.95 kg/m²   General:  Frail and contracted. Prevalon boots are on. In NAD. Integument:  Dry gangrenous changes at toes 1-4 on the right. Minimal surrounding erythema. No active drainage. No fluctuance. No evidence of deep space abscess. Otherwise, her skin is thin, dry and atrophic, bilateral.  Neurologic:  Patient did not respond to stimulus at her feet. Vascular:  DP and PT pulses are nonpalpable, bilateral.   No significant edema appreciated.   Musculoskeletal: Digital amputations noted at the left foot    Labs:   CBC with Differential:    Lab Results   Component Value Date    WBC 9.9 03/30/2021    RBC 4.23 03/30/2021    HGB 12.2 03/30/2021    HCT 36.1 03/30/2021     03/30/2021    MCV 85.2 03/30/2021    MCH 28.8 03/30/2021    MCHC 33.8 03/30/2021    RDW 12.1 03/30/2021    LYMPHOPCT 8.3 03/30/2021    MONOPCT 7.0 03/30/2021    BASOPCT 0.5 03/30/2021    MONOSABS 0.7 03/30/2021    LYMPHSABS 0.8 03/30/2021    EOSABS 0.1 03/30/2021    BASOSABS 0.1 03/30/2021     CMP:    Lab Results   Component Value Date     03/30/2021    K 3.1 03/30/2021     03/30/2021    CO2 28 03/30/2021    BUN 11 03/30/2021    CREATININE <0.5 03/30/2021    GFRAA >60 03/30/2021    AGRATIO 0.9 03/30/2021    LABGLOM >60 03/30/2021    GLUCOSE 129 03/30/2021    PROT 6.2 03/30/2021    LABALBU 3.0 03/30/2021    CALCIUM 8.5 03/30/2021    BILITOT 0.3 03/30/2021    ALKPHOS 89 03/30/2021    AST 34 03/30/2021    ALT 23 03/30/2021     PT/INR:    Lab Results   Component Value Date    PROTIME 12.5 03/30/2021    INR 1.08 03/30/2021     Last 3 Troponin:  No results found for: TROPONINI  HgBA1c:    Lab Results   Component Value Date    LABA1C 6.5 03/30/2021     Assessment/Plan:   The patient is an 81year old woman   1) Dry gangrene, right foot  - Apply betadine to toes daily  - WBC 9.9 (3/30/2021)  - Antibiotics per ID  - Prevalon boots  - Palliative consult pending  - Will follow while in house  2) Osteomyelitis, right hallux  - Noted on MRI at [facility]  - No fluid collection noted on MRI  3) DM    Derrek Millan, KALYN, YARIEL, [address]  Office: [phone]  Cell: [phone]

## 2021-04-01 NOTE — CONSULTS
Physically abused: Not on file     Forced sexual activity: Not on file   Other Topics Concern    Not on file   Social History Narrative    Not on file       Family History:   Breast Cancer-related family history is not on file. Review of Systems:    Unable to review. Patient was asleep the entire encounter. Physical Exam:    Vitals:    /67   Pulse 87   Temp 97.7 °F (36.5 °C) (Axillary)   Resp 16   Ht 5' 1\" (1.549 m)   Wt 68 lb 9 oz (31.1 kg)   SpO2 93%   BMI 12.95 kg/m²    General:  Frail and contracted. Prevalon boots are on. Integument:  Dry gangrenous changes at toes 1-4 on the right. Minimal surrounding erythema. No active drainage. No fluctuance. No evidence of deep space abscess. Otherwise, her skin is thin, dry and atrophic, bilateral.  Neurologic:  Patient did not respond to stimulus at her feet. Vascular:  DP and PT pulses are nonpalpable, bilateral.   No significant edema appreciated.   Musculoskeletal: Digital amputations noted at the left foot    Labs:  CBC with Differential:    Lab Results   Component Value Date    WBC 9.9 03/30/2021    RBC 4.23 03/30/2021    HGB 12.2 03/30/2021    HCT 36.1 03/30/2021     03/30/2021    MCV 85.2 03/30/2021    MCH 28.8 03/30/2021    MCHC 33.8 03/30/2021    RDW 12.1 03/30/2021    LYMPHOPCT 8.3 03/30/2021    MONOPCT 7.0 03/30/2021    BASOPCT 0.5 03/30/2021    MONOSABS 0.7 03/30/2021    LYMPHSABS 0.8 03/30/2021    EOSABS 0.1 03/30/2021    BASOSABS 0.1 03/30/2021     CMP:    Lab Results   Component Value Date     03/30/2021    K 3.1 03/30/2021     03/30/2021    CO2 28 03/30/2021    BUN 11 03/30/2021    CREATININE <0.5 03/30/2021    GFRAA >60 03/30/2021    AGRATIO 0.9 03/30/2021    LABGLOM >60 03/30/2021    GLUCOSE 129 03/30/2021    PROT 6.2 03/30/2021    LABALBU 3.0 03/30/2021    CALCIUM 8.5 03/30/2021    BILITOT 0.3 03/30/2021    ALKPHOS 89 03/30/2021    AST 34 03/30/2021    ALT 23 03/30/2021     PT/INR:    Lab Results Component Value Date    PROTIME 12.5 03/30/2021    INR 1.08 03/30/2021     Last 3 Troponin:  No results found for: TROPONINI  HgBA1c:    Lab Results   Component Value Date    LABA1C 6.5 03/30/2021       Impression/Recommendations: The patient is an 81year old woman   1) Dry gangrene, right foot  - Apply betadine to toes daily  - WBC 9.9 (3/30/2021)  - Antibiotics per ID  - Prevalon boots  - Discussed patient with Dr. Adwoa Ramires. I agree with his note. I think palliative care should certainly be considered. The patient is non-ambulatory. While a transmetatarsal amputation could be performed I think it would most likely fail  - Will follow while in house  2) Osteomyelitis, right hallux  - Noted on MRI at [facility]  - No fluid collection noted on MRI  3) DM    Thank you for the opportunity to take part in this patient's care.   Please feel free to contact me with any questions    Evelyn Araya 26, Alison Teran [address] [place] [address]  Office: [phone]  Cell: [phone]

## 2021-04-01 NOTE — PLAN OF CARE
Problem: Skin Integrity:  Goal: Will show no infection signs and symptoms  Description: Will show no infection signs and symptoms  Outcome: Ongoing   Pt to have no new areas of skin break down. Wound care orders followed and mepilex applied to bony prominences. Off loading boots are applied to heels.

## 2021-04-02 VITALS
BODY MASS INDEX: 13.94 KG/M2 | WEIGHT: 73.85 LBS | RESPIRATION RATE: 18 BRPM | DIASTOLIC BLOOD PRESSURE: 62 MMHG | HEIGHT: 61 IN | HEART RATE: 74 BPM | TEMPERATURE: 97.9 F | SYSTOLIC BLOOD PRESSURE: 160 MMHG | OXYGEN SATURATION: 99 %

## 2021-04-02 LAB
GLUCOSE BLD-MCNC: 78 MG/DL (ref 70–99)
PERFORMED ON: NORMAL

## 2021-04-02 PROCEDURE — 6370000000 HC RX 637 (ALT 250 FOR IP): Performed by: INTERNAL MEDICINE

## 2021-04-02 PROCEDURE — 6360000002 HC RX W HCPCS: Performed by: INTERNAL MEDICINE

## 2021-04-02 PROCEDURE — 2580000003 HC RX 258: Performed by: INTERNAL MEDICINE

## 2021-04-02 RX ADMIN — ENOXAPARIN SODIUM 40 MG: 40 INJECTION SUBCUTANEOUS at 09:00

## 2021-04-02 RX ADMIN — LISINOPRIL 20 MG: 20 TABLET ORAL at 08:59

## 2021-04-02 RX ADMIN — HYDRALAZINE HYDROCHLORIDE 10 MG: 20 INJECTION INTRAMUSCULAR; INTRAVENOUS at 00:13

## 2021-04-02 RX ADMIN — PIPERACILLIN AND TAZOBACTAM 3375 MG: 3; .375 INJECTION, POWDER, FOR SOLUTION INTRAVENOUS at 03:18

## 2021-04-02 ASSESSMENT — PAIN DESCRIPTION - ORIENTATION: ORIENTATION: RIGHT

## 2021-04-02 NOTE — DISCHARGE SUMMARY
Hospital Medicine Discharge Summary    Patient ID: Indio Godwin      Patient's PCP: Irineo Mcdonough, APRN - CNP    Admit Date: 3/30/2021     Discharge Date: 4/2/2021      Admitting Physician: Rochelle German MD     Discharge Physician: Reji Santana MD     Discharge Diagnoses: Active Hospital Problems    Diagnosis    Osteomyelitis ([facility] [address] [M86.9]       The patient was seen and examined on day of discharge and this discharge summary is in conjunction with any daily progress note from day of discharge. Hospital Course:   History Of Present Illness: (per emr/paper chart as pt is poor historian)     81 y.o. female with PVD/PAD(has seen Dr. Faustina Owen), dm2, prior CVA(per emr) who presented to [facility] with gangrene and possible rt foot OM. Pt first presented and admitted to [facility] on 3/26 for severe weakness and n/v. Pt was given ivfs and podiatry was consulted for gangrene of right foot/toes(pt apparently developed blackening of right toes 3 days prior to admission). Pt currently denies cp/sob and denies foot/leg pain. Pt was transferred here for further evaluation and possibly presumed vascular surgery intervention.  Per notes, family was requesting aggressive care.         Dry gangrene of right foot/toes- noted on admission, no pain currently  -on iv zosyn at [facility], continued here  -Podiatry consulted, apprec recs, no plans for surgery given risk of nonhealing  -ID consulted given OM noted on outside MRI, plan for doxycycline when able, use topical betadine, rec palliative care   Podiatry also agreed with palliative care, CM consulted and arranged for home hospice    PVD-per emr, seen Dr. Faustina Owen in 2018  -vas surg consulted, apprec recs, pt is not a surgical candidate and rec hospice     DM2-per emr  Ac/hs bs  Low ssi  -checked a1c and 6.5     HTN- elevated, not on meds currently it seems, may be related to foot pain  -resumed home acei at lower dose, titrate up as outpt  -defer further mgmt to PCP    Dementia- ongoing, possibly worsening given pt's debility and frailty/cachexia  -SLP eval ordered    Physical Exam Performed:     BP (!) 160/62   Pulse 74   Temp 97.9 °F (36.6 °C) (Axillary)   Resp 18   Ht 5' 1\" (1.549 m)   Wt 73 lb 13.7 oz (33.5 kg)   SpO2 99%   BMI 13.95 kg/m²       General appearance:  No apparent distress, appears stated age and cooperative. Frail and cachetic appearing  HEENT:  Normal cephalic, atraumatic without obvious deformity. Pupils equal, round, and reactive to light.  Extra ocular muscles intact. Conjunctivae/corneas clear. Neck: Supple, with full range of motion. No jugular venous distention. Trachea midline. Respiratory:  Normal respiratory effort. Clear to auscultation, bilaterally without Rales/Wheezes/Rhonchi. Cardiovascular:  Regular rate and rhythm with normal S1/S2 without murmurs, rubs or gallops. Abdomen: Soft, non-tender, non-distended with normal bowel sounds. Musculoskeletal:  No clubbing, cyanosis or edema bilaterally.  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  No rashes or lesions. Neurologic:  Neurovascularly intact without any focal sensory/motor deficits. Cranial nerves: II-XII intact, grossly non-focal.  Psychiatric:  Alert and oriented x1, thought content not appropriate, not normal insight, not interactive with author  Capillary Refill: Brisk,< 3 seconds   Peripheral Pulses: +2 palpable, equal bilaterally       Labs:  For convenience and continuity at follow-up the following most recent labs are provided:      CBC:    Lab Results   Component Value Date    WBC 9.9 03/30/2021    HGB 12.2 03/30/2021    HCT 36.1 03/30/2021     03/30/2021       Renal:    Lab Results   Component Value Date     03/30/2021    K 3.1 03/30/2021     03/30/2021    CO2 28 03/30/2021    BUN 11 03/30/2021    CREATININE <0.5 03/30/2021    CALCIUM 8.5 03/30/2021         Significant Diagnostic Studies    Radiology:   No orders to display          Consults:     IP CONSULT TO PODIATRY  IP CONSULT TO INFECTIOUS DISEASES  IP CONSULT TO VASCULAR SURGERY  PALLIATIVE CARE EVAL  IP CONSULT TO HOSPICE    Disposition:  Home with hospice     Condition at Discharge: Stable    Discharge Instructions/Follow-up:  none    Code Status:  DNRcc    Activity: activity as tolerated    Diet: as tolerated      Discharge Medications:     Discharge Medication List as of 4/2/2021  8:59 AM           Details   Morphine Sulfate (MORPHINE 20MG/ML) SOLN concentrated solution Take 0.25 mLs by mouth every 4 hours as needed (pain/sob) for up to 20 days. , Disp-30 mL, R-0Print      LORazepam (ATIVAN) 0.5 MG tablet Take 0.5 tablets by mouth every 6 hours as needed for Anxiety for up to 5 days. , Disp-5 tablet, R-0Print      hyoscyamine (LEVSIN/SL) 125 MCG sublingual tablet Place 1 tablet under the tongue every 6 hours as needed for Cramping (secretions), Disp-10 tablet, R-0Print              Details   lisinopril (PRINIVIL;ZESTRIL) 20 MG tablet Take 1 tablet by mouth daily, Disp-30 tablet, R-1Print              Details   aspirin 81 MG tablet Take 81 mg by mouth dailyHistorical Med      vitamin D (ERGOCALCIFEROL) 87293 UNITS CAPS capsule Take 50,000 Units by mouth once a week. Time Spent on discharge is more than 30 minutes in the examination, evaluation, counseling and review of medications and discharge plan. Signed:    Patito Gresham MD   4/2/2021      Thank you WASHINGTON Ghotra CNP for the opportunity to be involved in this patient's care. If you have any questions or concerns please feel free to contact me at [phone].

## 2021-04-02 NOTE — PROGRESS NOTES
Message sent to [address] [place] [address] David NP: \"Pts BP was 193/65 at 0008. Gave hydralazine as ordered.  Will continue to monitor\"

## 2021-04-02 NOTE — PROGRESS NOTES
Podiatry Progress Note  Subjective:   Patient was seen at bedside this morning. She did not communicate with me. She was in no apparent distress. Objective:   Vitals:  BP (!) 160/62   Pulse 74   Temp 97.9 °F (36.6 °C) (Axillary)   Resp 18   Ht 5' 1\" (1.549 m)   Wt 73 lb 13.7 oz (33.5 kg)   SpO2 99%   BMI 13.95 kg/m²   General:  Frail and contracted. In NAD. Integument:  Dry gangrenous changes at toes 1-4 on the right. Minimal surrounding erythema. No active drainage. No fluctuance. No evidence of deep space abscess. Otherwise, her skin is thin, dry and atrophic, bilateral.  Neurologic:  Patient did not respond to stimulus at her feet. Vascular:  DP and PT pulses are nonpalpable, bilateral.   No significant edema appreciated.   Musculoskeletal: Digital amputations noted at the left foot    Labs:   CBC with Differential:    Lab Results   Component Value Date    WBC 9.9 03/30/2021    RBC 4.23 03/30/2021    HGB 12.2 03/30/2021    HCT 36.1 03/30/2021     03/30/2021    MCV 85.2 03/30/2021    MCH 28.8 03/30/2021    MCHC 33.8 03/30/2021    RDW 12.1 03/30/2021    LYMPHOPCT 8.3 03/30/2021    MONOPCT 7.0 03/30/2021    BASOPCT 0.5 03/30/2021    MONOSABS 0.7 03/30/2021    LYMPHSABS 0.8 03/30/2021    EOSABS 0.1 03/30/2021    BASOSABS 0.1 03/30/2021     CMP:    Lab Results   Component Value Date     03/30/2021    K 3.1 03/30/2021     03/30/2021    CO2 28 03/30/2021    BUN 11 03/30/2021    CREATININE <0.5 03/30/2021    GFRAA >60 03/30/2021    AGRATIO 0.9 03/30/2021    LABGLOM >60 03/30/2021    GLUCOSE 129 03/30/2021    PROT 6.2 03/30/2021    LABALBU 3.0 03/30/2021    CALCIUM 8.5 03/30/2021    BILITOT 0.3 03/30/2021    ALKPHOS 89 03/30/2021    AST 34 03/30/2021    ALT 23 03/30/2021     PT/INR:    Lab Results   Component Value Date    PROTIME 12.5 03/30/2021    INR 1.08 03/30/2021     Last 3 Troponin:  No results found for: TROPONINI  HgBA1c:    Lab Results   Component Value Date    LABA1C 6.5 03/30/2021     Assessment/Plan:   The patient is an 81year old woman   1) Dry gangrene, right foot  - Apply betadine to toes daily  - WBC 9.9 (3/30/2021)  - Antibiotics per ID  - Prevalon boots  - Palliative consult   - Will follow while in house  2) Osteomyelitis, right hallux  - Noted on MRI at [facility]  - No fluid collection noted on MRI  3) PARISH Fernandez, DPM, YARIEL, [address]  Office: [phone]  Cell: [phone]

## 2021-04-04 NOTE — PROGRESS NOTES
Physician Progress Note      PATIENT:               Lucendia Litten  CSN #:                  [id number]  :                       1935  ADMIT DATE:       3/30/2021 4:43 PM  Andrew Madrid DATE:        2021 9:39 AM  RESPONDING  PROVIDER #:        Vita Francis MD          QUERY TEXT:    Patient admitted with gangrene of foot. If possible, please document in   progress notes and discharge summary if you are evaluating and /or treating   any of the following: The medical record reflects the following:  Risk Factors: dementia, decreased intake  Clinical Indicators: BMI 12.95, cachectic, frail appearing, non healing wound  Treatment: pureed diet, SLP consult, nutritional supplements, supportive care    Thank you,  Addy Almonte RN, CDS  [phone]  Options provided:  -- Protein calorie malnutrition severe  -- Protein calorie malnutrition moderate  -- Protein calorie malnutrition mild  -- Underweight with BMI 12.95  -- Other - I will add my own diagnosis  -- Disagree - Not applicable / Not valid  -- Disagree - Clinically unable to determine / Unknown  -- Refer to Clinical Documentation Reviewer    PROVIDER RESPONSE TEXT:    This patient has severe protein calorie malnutrition.     Query created by: Vini Meraz on 2021 3:11 PM      Electronically signed by:  Vita Francis MD 2021 3:01 PM